# Patient Record
Sex: MALE | Race: WHITE | NOT HISPANIC OR LATINO | Employment: FULL TIME | URBAN - METROPOLITAN AREA
[De-identification: names, ages, dates, MRNs, and addresses within clinical notes are randomized per-mention and may not be internally consistent; named-entity substitution may affect disease eponyms.]

---

## 2019-10-03 ENCOUNTER — OFFICE VISIT (OUTPATIENT)
Dept: UROLOGY | Facility: CLINIC | Age: 44
End: 2019-10-03
Payer: COMMERCIAL

## 2019-10-03 VITALS
BODY MASS INDEX: 35.78 KG/M2 | HEART RATE: 96 BPM | DIASTOLIC BLOOD PRESSURE: 80 MMHG | WEIGHT: 270 LBS | HEIGHT: 73 IN | SYSTOLIC BLOOD PRESSURE: 134 MMHG | OXYGEN SATURATION: 98 %

## 2019-10-03 DIAGNOSIS — Z30.2 ENCOUNTER FOR STERILIZATION: Primary | ICD-10-CM

## 2019-10-03 PROCEDURE — 99203 OFFICE O/P NEW LOW 30 MIN: CPT | Performed by: UROLOGY

## 2019-10-03 RX ORDER — DIAZEPAM 10 MG
10 TABLET ORAL EVERY 6 HOURS PRN
Qty: 1 TABLET | Refills: 0 | Status: SHIPPED | OUTPATIENT
Start: 2019-10-03

## 2019-10-03 ASSESSMENT — MIFFLIN-ST. JEOR: SCORE: 2178.59

## 2019-10-03 ASSESSMENT — PAIN SCALES - GENERAL: PAINLEVEL: MILD PAIN (2)

## 2019-10-03 NOTE — PROGRESS NOTES
History: It is a great pleasure to see this very pleasant 43-year-old gentleman in initial consultation today.  He is interested in vasectomy.  He is  to a lady with 3 previous children and she is now expecting a child with him.  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  Is otherwise in stable health although there is no apparent history of both gout and problems with malignant hyperthermia.    Past Medical History:   Diagnosis Date     Gout      Malignant hyperthermia      Retrograde ejaculation        Past Surgical History:   Procedure Laterality Date     C LUMBAR SPINE FUSN,POST INTRBDY  04/04/03    dr ayla marti      HC EXCISION PILONIDAL LESION COMPLICATED      dr mixon      HC REPAIR ING HERNIA,5+Y/O,REDUCIBL      Inguinal Hernia Repair     SHOULDER SURGERY Right        Family History   Problem Relation Age of Onset     Neurologic Disorder Mother         migraine     Circulatory Maternal Grandmother         cva     Respiratory Brother         asthma     No Known Problems Sister      Bipolar Disorder Brother        Social History     Socioeconomic History     Marital status:      Spouse name: dennis     Number of children: 0     Years of education: 15     Highest education level: Not on file   Occupational History     Not on file   Social Needs     Financial resource strain: Not on file     Food insecurity:     Worry: Not on file     Inability: Not on file     Transportation needs:     Medical: Not on file     Non-medical: Not on file   Tobacco Use     Smoking status: Former Smoker     Packs/day: 0.00     Smokeless tobacco: Never Used     Tobacco comment: quit smoking  this year    Substance and Sexual Activity     Alcohol use: Yes     Drug use: Never     Sexual activity: Yes   Lifestyle     Physical activity:     Days per week: Not on file     Minutes per session: Not on file     Stress: Not on file   Relationships     Social connections:     Talks on phone: Not  "on file     Gets together: Not on file     Attends Mandaen service: Not on file     Active member of club or organization: Not on file     Attends meetings of clubs or organizations: Not on file     Relationship status: Not on file     Intimate partner violence:     Fear of current or ex partner: Not on file     Emotionally abused: Not on file     Physically abused: Not on file     Forced sexual activity: Not on file   Other Topics Concern      Service No     Blood Transfusions No     Caffeine Concern No     Occupational Exposure No     Hobby Hazards Not Asked     Sleep Concern No     Stress Concern No     Weight Concern No     Special Diet No     Back Care Not Asked     Exercise Not Asked     Bike Helmet No     Seat Belt Yes     Comment: sometimes      Self-Exams Not Asked     Parent/sibling w/ CABG, MI or angioplasty before 65F 55M? Not Asked   Social History Narrative     Not on file       Current Outpatient Medications   Medication Sig Dispense Refill     diazepam (VALIUM) 10 MG tablet Take 1 tablet (10 mg) by mouth every 6 hours as needed 1 tablet 0     KETOCONAZOLE 200 MG OR TABS take 2 tablets at breakfast with juice, do not bathe until next day, repeat this in two weeks (Patient not taking: No sig reported) 4 0       Review Of Systems:  Skin: negative  Eyes: negative, negative for  Ears/Nose/Throat: negative  Respiratory: No shortness of breath, dyspnea on exertion, cough, or hemoptysis  Cardiovascular: negative  Gastrointestinal: negative  Genitourinary: Retrograde ejaculation  Musculoskeletal: back pain  Neurologic: negative  Psychiatric: negative  Hematologic/Lymphatic/Immunologic: negative  Endocrine: negative    Exam:  /80   Pulse 96   Ht 1.854 m (6' 1\")   Wt 122.5 kg (270 lb)   SpO2 98%   BMI 35.62 kg/m      General Impression: Very pleasant gentleman in no acute distress, well-oriented in time place and person.    Mental status.  Normal    HEENT: There is no clinical evidence of " "jaundice, and the mucous membranes are normal    Skin: Skin is otherwise normal to examination    Lymph Nodes: There is no inguinal lymphadenopathy    Respiratory System: The respiratory cycle is normal    Cardiovascular: There is no significant peripheral edema    Abdominal: The abdomen is moderately obese.  There is a very small scar over the left inguinal canal from left inguinal hernia repair is a very small child.  There is no evidence of recurrent hernia    Extremities: Extremities otherwise unremarkable    Back and Flank: Not examined    Genital: Penis is circumcised with normal size meatus.  Both testes descended into the scrotum,  Both vas deferens easily palpable    Rectal: Not examined    Neurologic: There are no focal abnormal clinical neurological signs in the central, or peripheral nervous systems    Impression: I did discuss the procedure of vasectomy today with the patient in detail today.  I went over all the potential side effects and complications associated with the procedure.  I stressed to him the importance of not discontinuing normal contraceptive precautions until we have demonstrated a 0 sperm count after the procedure.  There is however an issue and that he has retrograde ejaculation however it may be difficult to obtain a good sperm specimen.  We will therefore, when the time comes after the surgery arrange for this to be done in the andrology lab where they can best analyze a fresh specimen and that may be a voided catch of semen.  I answered all his questions    Plan: Vasectomy    \"This dictation was performed with voice recognition software and may contain errors,  omissions and inadvertent word substitution.\"    "

## 2019-10-03 NOTE — NURSING NOTE
Chief Complaint   Patient presents with     Sterilization     patient is here for vasectomy consult.      Herlinda Gonzalez, CMA

## 2019-10-03 NOTE — LETTER
10/3/2019       RE: Dilan Perez  [address] [facility] [address]     Dear Colleague,    Thank you for referring your patient, Dilan Perez, to the [facility] [place] at [facility]. Please see a copy of my visit note below.    History: It is a great pleasure to see this very pleasant 43-year-old gentleman in initial consultation today.  He is interested in vasectomy.  He is  to a lady with 3 previous children and she is now expecting a child with him.  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  Is otherwise in stable health although there is no apparent history of both gout and problems with malignant hyperthermia.    Past Medical History:   Diagnosis Date     Gout      Malignant hyperthermia      Retrograde ejaculation        Past Surgical History:   Procedure Laterality Date     C LUMBAR SPINE FUSN,POST INTRBDY  04/04/03    dr ayla marti      HC EXCISION PILONIDAL LESION COMPLICATED      dr mixon      HC REPAIR ING HERNIA,5+Y/O,REDUCIBL      Inguinal Hernia Repair     SHOULDER SURGERY Right        Family History   Problem Relation Age of Onset     Neurologic Disorder Mother         migraine     Circulatory Maternal Grandmother         cva     Respiratory Brother         asthma     No Known Problems Sister      Bipolar Disorder Brother        Social History     Socioeconomic History     Marital status:      Spouse name: dennis     Number of children: 0     Years of education: 15     Highest education level: Not on file   Occupational History     Not on file   Social Needs     Financial resource strain: Not on file     Food insecurity:     Worry: Not on file     Inability: Not on file     Transportation needs:     Medical: Not on file     Non-medical: Not on file   Tobacco Use     Smoking status: Former Smoker     Packs/day: 0.00     Smokeless tobacco: Never Used     Tobacco comment:  quit smoking  this year    Substance and Sexual Activity     Alcohol use: Yes     Drug use: Never     Sexual activity: Yes   Lifestyle     Physical activity:     Days per week: Not on file     Minutes per session: Not on file     Stress: Not on file   Relationships     Social connections:     Talks on phone: Not on file     Gets together: Not on file     Attends Restorationist service: Not on file     Active member of club or organization: Not on file     Attends meetings of clubs or organizations: Not on file     Relationship status: Not on file     Intimate partner violence:     Fear of current or ex partner: Not on file     Emotionally abused: Not on file     Physically abused: Not on file     Forced sexual activity: Not on file   Other Topics Concern      Service No     Blood Transfusions No     Caffeine Concern No     Occupational Exposure No     Hobby Hazards Not Asked     Sleep Concern No     Stress Concern No     Weight Concern No     Special Diet No     Back Care Not Asked     Exercise Not Asked     Bike Helmet No     Seat Belt Yes     Comment: sometimes      Self-Exams Not Asked     Parent/sibling w/ CABG, MI or angioplasty before 65F 55M? Not Asked   Social History Narrative     Not on file       Current Outpatient Medications   Medication Sig Dispense Refill     diazepam (VALIUM) 10 MG tablet Take 1 tablet (10 mg) by mouth every 6 hours as needed 1 tablet 0     KETOCONAZOLE 200 MG OR TABS take 2 tablets at breakfast with juice, do not bathe until next day, repeat this in two weeks (Patient not taking: No sig reported) 4 0       Review Of Systems:  Skin: negative  Eyes: negative, negative for  Ears/Nose/Throat: negative  Respiratory: No shortness of breath, dyspnea on exertion, cough, or hemoptysis  Cardiovascular: negative  Gastrointestinal: negative  Genitourinary: Retrograde ejaculation  Musculoskeletal: back pain  Neurologic: negative  Psychiatric: negative  Hematologic/Lymphatic/Immunologic:  "negative  Endocrine: negative    Exam:  /80   Pulse 96   Ht 1.854 m (6' 1\")   Wt 122.5 kg (270 lb)   SpO2 98%   BMI 35.62 kg/m       General Impression: Very pleasant gentleman in no acute distress, well-oriented in time place and person.    Mental status.  Normal    HEENT: There is no clinical evidence of jaundice, and the mucous membranes are normal    Skin: Skin is otherwise normal to examination    Lymph Nodes: There is no inguinal lymphadenopathy    Respiratory System: The respiratory cycle is normal    Cardiovascular: There is no significant peripheral edema    Abdominal: The abdomen is moderately obese.  There is a very small scar over the left inguinal canal from left inguinal hernia repair is a very small child.  There is no evidence of recurrent hernia    Extremities: Extremities otherwise unremarkable    Back and Flank: Not examined    Genital: Penis is circumcised with normal size meatus.  Both testes descended into the scrotum,  Both vas deferens easily palpable    Rectal: Not examined    Neurologic: There are no focal abnormal clinical neurological signs in the central, or peripheral nervous systems    Impression: I did discuss the procedure of vasectomy today with the patient in detail today.  I went over all the potential side effects and complications associated with the procedure.  I stressed to him the importance of not discontinuing normal contraceptive precautions until we have demonstrated a 0 sperm count after the procedure.  There is however an issue and that he has retrograde ejaculation however it may be difficult to obtain a good sperm specimen.  We will therefore, when the time comes after the surgery arrange for this to be done in the andrology lab where they can best analyze a fresh specimen and that may be a voided catch of semen.  I answered all his questions    Plan: Vasectomy    \"This dictation was performed with voice recognition software and may contain errors,  " "omissions and inadvertent word substitution.\"      Again, thank you for allowing me to participate in the care of your patient.      Sincerely,    Yaniv Bashir MD      "

## 2019-11-07 ENCOUNTER — OFFICE VISIT (OUTPATIENT)
Dept: UROLOGY | Facility: CLINIC | Age: 44
End: 2019-11-07
Payer: COMMERCIAL

## 2019-11-07 VITALS
WEIGHT: 265 LBS | SYSTOLIC BLOOD PRESSURE: 130 MMHG | BODY MASS INDEX: 35.89 KG/M2 | HEIGHT: 72 IN | HEART RATE: 56 BPM | DIASTOLIC BLOOD PRESSURE: 80 MMHG

## 2019-11-07 DIAGNOSIS — Z30.2 ENCOUNTER FOR STERILIZATION: Primary | ICD-10-CM

## 2019-11-07 PROCEDURE — 55250 REMOVAL OF SPERM DUCT(S): CPT | Performed by: UROLOGY

## 2019-11-07 PROCEDURE — 88302 TISSUE EXAM BY PATHOLOGIST: CPT | Performed by: UROLOGY

## 2019-11-07 RX ORDER — KETOROLAC TROMETHAMINE 10 MG/1
10 TABLET, FILM COATED ORAL EVERY 6 HOURS PRN
Qty: 12 TABLET | Refills: 0 | Status: SHIPPED | OUTPATIENT
Start: 2019-11-07

## 2019-11-07 RX ORDER — LIDOCAINE HYDROCHLORIDE 20 MG/ML
20 INJECTION, SOLUTION INFILTRATION; PERINEURAL ONCE
Status: COMPLETED | OUTPATIENT
Start: 2019-11-07 | End: 2019-11-07

## 2019-11-07 RX ADMIN — LIDOCAINE HYDROCHLORIDE 20 ML: 20 INJECTION, SOLUTION INFILTRATION; PERINEURAL at 16:15

## 2019-11-07 ASSESSMENT — MIFFLIN-ST. JEOR: SCORE: 2140.03

## 2019-11-07 ASSESSMENT — PAIN SCALES - GENERAL: PAINLEVEL: NO PAIN (0)

## 2019-11-07 NOTE — PROGRESS NOTES
VASECTOMY NOTE:    Dilan Perez is a 43 year old male.  He has 2 children and he wishes a vasectomy for birth control.  He has read the brochure and he has shaved himself.  I reviewed the vasectomy procedure with him explaining that it would be done with a local anesthetic given just in the location where the vasectomy would be done.  It would be done through scalpel-less incisions with the removal of segments of the vasa, cauterization of the ends, and burying the ends separate with sutures.      Pt. Understands:  1/1000-1/3000 risk of future pregnancy even with perfectly done vasectomy  -vasectomy is a permanent procedure    -he may cryopreserve sperm if he wishes   -1-5% risk of post-vasectomy pain syndrome   -1-5% risk of complication, primarily infection or bleeding  - he needs to have a semen sample that shows no sperm before getting approval for unprotected intercourse.      Complications such as bleeding, infection, and damage to other tissues in the area were discussed.  I recommended that an ice bag be placed on the scrotum off and on tonight to help reduce pain and swelling.      He was reminded that he was not sterile immediately after the vasectomy that it would take at least 20 ejaculations to empty the vas of any remaining sperm.  He was not to provide a semen sample until after the 20th ejaculation and not before 12 weeks after the vas. He was  to fulfill both of those requirements.   He understands it is his responsibility to find out the results of the vas before proceeding with intercourse without birth control protection.  Other items discussed were activity afterwards, returning to work, voluntary physical activity,  resuming sexual activity, clothing to wear, bathing, and care of the vas site and expected changes in the site as healing progresses.  After signing the permit, bilateral vasectomy was done as described through scalpel-less incisions.     The right vas was ligated first.  This  was done using the standard technique by grasping it with a three-finger  and lifting up to the skin.  A small amount of lidocaine was infiltrated into the skin and vasal sheath.  The skin was punctured and dilated bluntly using the scalpelless dissector.  The sheath was identified and a rigid clamp was passed around the vas which was then lifted out of the incision.  The vas was cleaned off from the defenrential vessels and the sheath, and then divided with cautery and a small segment removed and sent.  The identity of the vas was confirmed by cannulating the lumen.  The lumens of both ends were then cauterized to burn the mucosa.  A tissue interposition was then done using a single suture of 4-0 chromic making sure that the two ends were at different fascial planes.  The skin was then closed with an interrupted chromic after confirming adequate hemostasis.     We then turned our attention to the left side and a similar technique was done.  Again, this involved division, excision of a segment, cautery of the ends/lumens, and tissue interposition.     There were no hematomas noted at the end of the procedure. Cautery was used sparingly for minor bleeders, he tolerated the procedure well.

## 2019-11-07 NOTE — PATIENT INSTRUCTIONS
POST VASECTOMY INSTRUCTIONS    1.) If you have any concerns or questions, please contact our office at [phone].     2.) It is okay to take a shower, however, do not soak in water (bath,swimming, hot tub,etc....) until your incision is healed.    3.) You might notice some swelling, mild bruising, and discomfort for several days after your vasectomy. This is to be expected. For at least the next 24 hours, an ice pack should be applied for 20 minutes every hour that you are awake. Ice will help with discomfort and swelling. Do not place directly on the skin.    4.) No intercourse, strenuous activity or exercise for at least 7-10 days, even if you feel fine.    5.) You need to wear good scrotal support while you are healing. We strongly recommend an athletic supporter or a pair of regular briefs that are one size too small. Boxer briefs do not offer enough support.    6.) Tylenol as directed on the bottle is preferred for discomfort. Please avoid any blood thinning products such as ibuprofen and aspirin (Motrin, Advil, Excedrin, Aleve, ect..) for at least the next week.    7.) It is normal to have mild drainage from the incision area for several days. However, please contact our office if you notice: bright red blood that does not stop after three days, increased pain, heat at the incision, red streaks, foul smelling discharge, or if you start to run a fever.     8.) YOU MUST CONTINUE BIRTH CONTROL UNTIL WE CONFIRM YOUR STERILITY.  This process can take up to a year to complete (rare occurrence).     9.) You have been given a form with specimen cup and instructions for your follow up specimen. You will be cleared once we receive ONE negative specimen. If your specimen comes back positive (sperm seen) you will be asked to repeat the test. This does not mean that your vasectomy has failed.

## 2019-11-07 NOTE — LETTER
11/7/2019       RE: Dilan Perez  [address]  [facility] [address]     Dear Colleague,    Thank you for referring your patient, Dilan Perez, to the [facility]OLOG[facility] [place] at [facility]. Please see a copy of my visit note below.    VASECTOMY NOTE:    Dilan Perez is a 43 year old male.  He has 2 children and he wishes a vasectomy for birth control.  He has read the brochure and he has shaved himself.  I reviewed the vasectomy procedure with him explaining that it would be done with a local anesthetic given just in the location where the vasectomy would be done.  It would be done through scalpel-less incisions with the removal of segments of the vasa, cauterization of the ends, and burying the ends separate with sutures.      Pt. Understands:  1/1000-1/3000 risk of future pregnancy even with perfectly done vasectomy  -vasectomy is a permanent procedure    -he may cryopreserve sperm if he wishes   -1-5% risk of post-vasectomy pain syndrome   -1-5% risk of complication, primarily infection or bleeding  - he needs to have a semen sample that shows no sperm before getting approval for unprotected intercourse.      Complications such as bleeding, infection, and damage to other tissues in the area were discussed.  I recommended that an ice bag be placed on the scrotum off and on tonight to help reduce pain and swelling.      He was reminded that he was not sterile immediately after the vasectomy that it would take at least 20 ejaculations to empty the vas of any remaining sperm.  He was not to provide a semen sample until after the 20th ejaculation and not before 12 weeks after the vas. He was  to fulfill both of those requirements.   He understands it is his responsibility to find out the results of the vas before proceeding with intercourse without birth control protection.  Other items discussed were activity afterwards, returning to  work, voluntary physical activity,  resuming sexual activity, clothing to wear, bathing, and care of the vas site and expected changes in the site as healing progresses.  After signing the permit, bilateral vasectomy was done as described through scalpel-less incisions.     The right vas was ligated first.  This was done using the standard technique by grasping it with a three-finger  and lifting up to the skin.  A small amount of lidocaine was infiltrated into the skin and vasal sheath.  The skin was punctured and dilated bluntly using the scalpelless dissector.  The sheath was identified and a rigid clamp was passed around the vas which was then lifted out of the incision.  The vas was cleaned off from the defenrential vessels and the sheath, and then divided with cautery and a small segment removed and sent.  The identity of the vas was confirmed by cannulating the lumen.  The lumens of both ends were then cauterized to burn the mucosa.  A tissue interposition was then done using a single suture of 4-0 chromic making sure that the two ends were at different fascial planes.  The skin was then closed with an interrupted chromic after confirming adequate hemostasis.     We then turned our attention to the left side and a similar technique was done.  Again, this involved division, excision of a segment, cautery of the ends/lumens, and tissue interposition.     There were no hematomas noted at the end of the procedure. Cautery was used sparingly for minor bleeders, he tolerated the procedure well.     Again, thank you for allowing me to participate in the care of your patient.      Sincerely,    Yaniv Bashir MD

## 2019-11-07 NOTE — NURSING NOTE
Chief Complaint   Patient presents with     Sterilization     patient is here for vasectomy.      Patient has signed the consent form stating that we will be doing a bilateral vasectomy today and that this is the correct procedure.  I verbally confirmed the patient's identity using two indicators, relevant allergies, and that the correct equipment was available. Patient was cleaned and prepped according to the appropriate policy.  Equipment was prepped in a sterile fashion and MD was informed that patient was ready.    Consent read and signed: Yes  Aspirin/blood thinning products stopped 7 days prior to procedure: Yes  Allergies   Allergen Reactions     No Known Drug Allergies        Physician performed procedure.  After the procedure the patient was instructed to wait approximately three months and at least 30 ejaculations prior to returning a sample to confirm sterility.  The patient was instructed to bring in sample within 3-6 hours post sample ejaculation.  Any additional medications after the procedure were sent to the patient's pharmacy and instructions were given according to company protocol and the performing physician.  The physician performing the procedure prescribed as they felt appropriate.  Patient was also instructed to avoid heavy lifting or strenuous activity for 10-14 days and to ice the scrotum.  Samples from the R and L vas deferens were sent to the lab and an order was placed for future semen analysis.      The following medication was given:     MEDICATION:  Lidocaine 2% Soln  ROUTE: Vas defernes  SITE: Vas deferens  DOSE: 20 ml  LOT #: [id number]  : [employer]  EXPIRATION DATE: 04/2023  NDC#: 57878-161-65   Was there drug waste? No  Multi-dose vial: Yes    Herlinda Gonzalez CMA  November 7, 2019

## 2019-11-11 LAB — COPATH REPORT: NORMAL

## 2019-12-19 ENCOUNTER — OFFICE VISIT (OUTPATIENT)
Dept: UROLOGY | Facility: CLINIC | Age: 44
End: 2019-12-19
Payer: COMMERCIAL

## 2019-12-19 VITALS
HEART RATE: 84 BPM | DIASTOLIC BLOOD PRESSURE: 80 MMHG | WEIGHT: 265 LBS | SYSTOLIC BLOOD PRESSURE: 120 MMHG | HEIGHT: 72 IN | OXYGEN SATURATION: 97 % | BODY MASS INDEX: 35.89 KG/M2

## 2019-12-19 DIAGNOSIS — Z30.2 ENCOUNTER FOR STERILIZATION: Primary | ICD-10-CM

## 2019-12-19 PROCEDURE — 99024 POSTOP FOLLOW-UP VISIT: CPT | Performed by: UROLOGY

## 2019-12-19 ASSESSMENT — MIFFLIN-ST. JEOR: SCORE: 2135.03

## 2019-12-19 ASSESSMENT — PAIN SCALES - GENERAL: PAINLEVEL: MILD PAIN (2)

## 2019-12-19 NOTE — PROGRESS NOTES
"This very pleasant 44-year-old gentleman had a vasectomy 4 weeks ago and is still getting some rather significant discomfort in the scrotal area usually when 1 of his children sits in his lap.  There is no evidence of fever or any other significant symptoms.  Examination of the scrotum today indicate significant sperm granuloma on each side which I would consider normal and some moderate tenderness on the left side over the sperm granuloma.  I have explained this carefully to the patient in detail today.  It seems that the only time this is causing significant problems is when there is minor trauma to the area and therefore he should avoid this for the next 4 to 6 weeks until the symptoms are completely subsided.  In addition he should wear good support for the scrotal area and I recommend taking anti-inflammatory medication as needed.  I have reassured him that the mass she is feeling is consistent with a normal finding after vasectomy.  I be happy to see him again should this not resolve in the way we expect.    Plan.  I will see him on a as needed basis.    Time.  Postoperative visit    \"This dictation was performed with voice recognition software and may contain errors,  omissions and inadvertent word substitution.\"    "

## 2019-12-19 NOTE — LETTER
"12/19/2019       RE: Dilan Perez  [id number] [facility] [address]     Dear Colleague,    Thank you for referring your patient, Dilan Perez, to the [facility] [place] at [facility]. Please see a copy of my visit note below.    This very pleasant 44-year-old gentleman had a vasectomy 4 weeks ago and is still getting some rather significant discomfort in the scrotal area usually when 1 of his children sits in his lap.  There is no evidence of fever or any other significant symptoms.  Examination of the scrotum today indicate significant sperm granuloma on each side which I would consider normal and some moderate tenderness on the left side over the sperm granuloma.  I have explained this carefully to the patient in detail today.  It seems that the only time this is causing significant problems is when there is minor trauma to the area and therefore he should avoid this for the next 4 to 6 weeks until the symptoms are completely subsided.  In addition he should wear good support for the scrotal area and I recommend taking anti-inflammatory medication as needed.  I have reassured him that the mass she is feeling is consistent with a normal finding after vasectomy.  I be happy to see him again should this not resolve in the way we expect.    Plan.  I will see him on a as needed basis.    Time.  Postoperative visit    \"This dictation was performed with voice recognition software and may contain errors,  omissions and inadvertent word substitution.\"      Again, thank you for allowing me to participate in the care of your patient.      Sincerely,    Yaniv Bashir MD      "

## 2019-12-19 NOTE — NURSING NOTE
Chief Complaint   Patient presents with     Follow Up     patient is here for lump in left testicle after vasectomy.      Herlinda Gonzalez, CMA

## 2020-02-07 ENCOUNTER — TELEPHONE (OUTPATIENT)
Dept: UROLOGY | Facility: CLINIC | Age: 45
End: 2020-02-07

## 2020-02-07 DIAGNOSIS — Z30.2 ENCOUNTER FOR STERILIZATION: Primary | ICD-10-CM

## 2020-02-07 NOTE — TELEPHONE ENCOUNTER
[facility] Call Center    Phone Message    May a detailed message be left on voicemail: yes     Reason for Call: Other:      Pt is calling in to schedule his 3 month f/u testing after vasectomy.  He stated that nothing comes out so he needs to do the test in the lab.  Please call him to discuss and arrange the testing that is needed since I do not see any testing ordered.       Action Taken: Message routed to:  Other: [facility]    Travel Screening: Not Applicable

## 2020-02-11 NOTE — TELEPHONE ENCOUNTER
Patient requests number to lab be sent to him through [facility] to schedule appointment. Order placed per Lab.     Joan Garvin LPN

## 2020-02-16 ENCOUNTER — HEALTH MAINTENANCE LETTER (OUTPATIENT)
Age: 45
End: 2020-02-16

## 2020-02-21 DIAGNOSIS — Z30.2 ENCOUNTER FOR STERILIZATION: ICD-10-CM

## 2020-02-21 PROCEDURE — 89331 RETROGRADE EJACULATION ANAL: CPT

## 2020-02-24 LAB
ABNORMAL SPERM: NORMAL MORPHOLOGY
ABSTINENCE DAYS: 4 DAYS (ref 2–7)
AGGLUTINATION: NORMAL YES/NO
ANALYSIS TEMP - CENTIGRADE: 22 CENTIGRADE
CELL FRAGMENTS: NORMAL %
COLLECTION METHOD: NORMAL
COLLECTION SITE: NORMAL
CONSENT TO RELEASE TO PARTNER: YES
HEAD DEFECT: NORMAL
IMMATURE SPERM: NORMAL %
IMMOTILE: 0 %
LAB RECEIPT TIME: NORMAL
MIDPIECE DEFECT: NORMAL
NON-PROGRESSIVE MOTILITY: 0 %
NORMAL SPERM: NORMAL % NORMAL FORMS (ref 4–?)
PROGRESSIVE MOTILITY: 0 % (ref 32–?)
ROUND CELLS: 0 MILLION/ML (ref ?–2)
SPECIMEN CONCENTRATION: 0 MILLION/ML (ref 15–?)
SPECIMEN PH: 7.2 PH (ref 7.2–?)
SPECIMEN TYPE: NORMAL
SPECIMEN VOL UR: 200 ML (ref 1.5–?)
TAIL DEFECT: NORMAL
TIME OF ANALYSIS: NORMAL
TOTAL NUMBER: 0 MILLION (ref 39–?)
TOTAL PROGRESSIVE MOTILE: 0 MILLION (ref 15.6–?)
VITALITY: NORMAL % (ref 58–?)
WBC SPECIMEN: NORMAL %

## 2020-02-25 ENCOUNTER — TELEPHONE (OUTPATIENT)
Dept: UROLOGY | Facility: CLINIC | Age: 45
End: 2020-02-25

## 2020-02-25 NOTE — TELEPHONE ENCOUNTER
Called Dilan with results as below. He expressed understanding.  ELISABET Leyva RN      ----- Message from Yaniv Bashir MD sent at 2/25/2020  8:25 AM CST -----  If he has not already been advised his sperm counts were negative post vasectomy.  Thanks JH

## 2020-11-16 ENCOUNTER — HEALTH MAINTENANCE LETTER (OUTPATIENT)
Age: 45
End: 2020-11-16

## 2021-04-04 ENCOUNTER — HEALTH MAINTENANCE LETTER (OUTPATIENT)
Age: 46
End: 2021-04-04

## 2021-09-18 ENCOUNTER — HEALTH MAINTENANCE LETTER (OUTPATIENT)
Age: 46
End: 2021-09-18

## 2022-04-30 ENCOUNTER — HEALTH MAINTENANCE LETTER (OUTPATIENT)
Age: 47
End: 2022-04-30

## 2022-11-20 ENCOUNTER — HEALTH MAINTENANCE LETTER (OUTPATIENT)
Age: 47
End: 2022-11-20

## 2023-06-01 ENCOUNTER — HEALTH MAINTENANCE LETTER (OUTPATIENT)
Age: 48
End: 2023-06-01